# Patient Record
(demographics unavailable — no encounter records)

---

## 2025-01-21 NOTE — PHYSICAL EXAM
[Alert] : alert [Normal Voice/Communication] : normal voice/communication [Healthy Appearing] : healthy appearing [No Acute Distress] : no acute distress [Sclera] : the sclera and conjunctiva were normal [Hearing Threshold Finger Rub Not [place]] : hearing was normal [Normal Lips/Gums] : the lips and gums were normal [Oropharynx] : the oropharynx was normal [Normal Appearance] : the appearance of the neck was normal [No Neck Mass] : no neck mass was observed [No Respiratory Distress] : no respiratory distress [No Acc Muscle Use] : no accessory muscle use [Respiration, Rhythm And Depth] : normal respiratory rhythm and effort [Auscultation Breath Sounds / Voice Sounds] : lungs were clear to auscultation bilaterally [Heart Rate And Rhythm] : heart rate was normal and rhythm regular [Normal S1, S2] : normal S1 and S2 [Murmurs] : no murmurs [Bowel Sounds] : normal bowel sounds [Abdomen Tenderness] : non-tender [No Masses] : no abdominal mass palpated [Abdomen Soft] : soft [] : no hepatosplenomegaly [Epigastric] : in the epigastric area [LLQ] : in the left lower quadrant [Oriented To Time, Place, And Person] : oriented to person, place, and time [de-identified] : Medium frame and build.  No acute distress. [FreeTextEntry1] : Anicteric sclera. [de-identified] : Moist mucosa.  No pharyngitis. [de-identified] : No neck masses or adenopathy. [de-identified] : Clear to A&P. [de-identified] : No murmurs rubs or gallops. [de-identified] : Flat soft bowel sounds present.  No organomegaly.  No mass.  No rebound.  Modest epigastric tenderness to deep palpation.  Modest tenderness in left lower quadrant.  No guarding.  No rebound.  No distention. [de-identified] : Deferred. [de-identified] : Intact. [de-identified] : Intact. [de-identified] : Normal.

## 2025-01-21 NOTE — REASON FOR VISIT
[Consultation] : a consultation visit [FreeTextEntry1] : Left lower quadrant abdominal pain.  Diverticulosis.  History of diverticulitis and sigmoid colon resection.  Abnormal CAT scan

## 2025-01-21 NOTE — ASSESSMENT
[FreeTextEntry1] : History of diverticular disease.  Status post sigmoid resection for nonresolving diverticulitis in 2012.  Noted to have left-sided diverticulosis on CAT scan in 2023.  Repeat CAT scan recently done on 1/15/2025 showing persistence of postsurgical changes in the left lower quadrant but no acute diverticulitis.  No abscess or obstruction.  Moderate tenderness; no peritoneal signs. Reasonable candidate for a course of antibiotics.'s new prescription for Cipro 500 mg p.o. twice daily for 1 week and Flagyl 500 mg p.o. 3 times daily for 1 week were given.  Patient was advised low residue diet for 2 weeks.  Then convert to high-fiber diet.  GI office follow-up here in 2 months.  Gallstones present, unchanged from 2023.  No evidence for acute cholecystitis or biliary colic or pancreatitis. No intervention needed.  Patient advised against any efforts at gallstone dissolution. Watchful waiting.

## 2025-01-21 NOTE — HISTORY OF PRESENT ILLNESS
[FreeTextEntry1] : Approximately 5 years ago; alleged normal.  Status post sigmoid colon resection.  No official report.  Cannot recall provider.  No records available on old chart.